# Patient Record
Sex: MALE | Race: OTHER | Employment: FULL TIME | URBAN - METROPOLITAN AREA
[De-identification: names, ages, dates, MRNs, and addresses within clinical notes are randomized per-mention and may not be internally consistent; named-entity substitution may affect disease eponyms.]

---

## 2018-02-04 ENCOUNTER — HOSPITAL ENCOUNTER (OUTPATIENT)
Age: 43
Discharge: HOME | End: 2018-02-04
Attending: EMERGENCY MEDICINE
Payer: COMMERCIAL

## 2018-02-04 VITALS
HEIGHT: 70 IN | TEMPERATURE: 97 F | SYSTOLIC BLOOD PRESSURE: 130 MMHG | OXYGEN SATURATION: 98 % | HEART RATE: 72 BPM | WEIGHT: 180 LBS | BODY MASS INDEX: 25.77 KG/M2 | RESPIRATION RATE: 20 BRPM | DIASTOLIC BLOOD PRESSURE: 92 MMHG

## 2018-02-04 DIAGNOSIS — S01.21XA LACERATION OF NOSE, INITIAL ENCOUNTER: Primary | ICD-10-CM

## 2018-02-04 PROCEDURE — 12011 RPR F/E/E/N/L/M 2.5 CM/<: CPT

## 2018-02-04 PROCEDURE — 90471 IMMUNIZATION ADMIN: CPT

## 2018-02-04 PROCEDURE — 99203 OFFICE O/P NEW LOW 30 MIN: CPT

## 2018-02-04 NOTE — ED INITIAL ASSESSMENT (HPI)
Patient was working last night and about 6-7 hours ago was hit in the nose with a large cable.  He has a laceration with skin flap on the bridge of his nose and superficial laceration near his inner right eye brow, and swelling and bruising to the right tanja

## 2018-02-04 NOTE — ED PROVIDER NOTES
Patient presents with:  Laceration Abrasion (integumentary)  Trauma (cardiovascular, musculoskeletal)    HPI:     Miguelangel Wilkins is a 38year old male who presents with chief complaint of nasal laceration.   About 6 hours ago pt was at work and a cable Anesthesia provided, as stated above. Laceration repaired as stated above. Patient tolerated the procedure well. MDM:     Nasal laceration - cleaned and closed with dermabond. Tdap updated. F/u with PCP in 2 days for wound re-check.      Assessment

## 2018-02-08 ENCOUNTER — HOSPITAL ENCOUNTER (OUTPATIENT)
Age: 43
Discharge: HOME | End: 2018-02-08
Attending: FAMILY MEDICINE
Payer: COMMERCIAL

## 2018-02-08 VITALS
BODY MASS INDEX: 25.77 KG/M2 | TEMPERATURE: 97 F | SYSTOLIC BLOOD PRESSURE: 121 MMHG | HEIGHT: 70 IN | HEART RATE: 63 BPM | RESPIRATION RATE: 16 BRPM | OXYGEN SATURATION: 99 % | WEIGHT: 180 LBS | DIASTOLIC BLOOD PRESSURE: 89 MMHG

## 2018-02-08 DIAGNOSIS — S01.21XD LACERATION OF NOSE, SUBSEQUENT ENCOUNTER: Primary | ICD-10-CM

## 2018-02-08 PROCEDURE — 99211 OFF/OP EST MAY X REQ PHY/QHP: CPT

## 2018-02-08 NOTE — ED PROVIDER NOTES
Patient Seen in: [id number] [facility] [place]    History   No chief complaint on file. Stated Complaint: follow up on nose injury    HPI    57-year-old male who returns to the clinic today as part of recheck for evaluation of nose laceration.    Amairani Jones S1, S2 normal, no murmur, click, rub or gallop, regular rate and rhythm  Abdomen: soft, non-tender; bowel sounds normal; no masses,  no organomegaly  Extremities: extremities normal, atraumatic, no cyanosis or edema  Pulses: 2+ and symmetric  Skin: Skin co

## 2018-02-08 NOTE — ED INITIAL ASSESSMENT (HPI)
Patient was treated here on 2/4/18 by Dr Mike Barnhart for a nasal laceration. Dermabond was applied. Patient here for follow up.

## 2019-03-02 ENCOUNTER — HOSPITAL ENCOUNTER (EMERGENCY)
Facility: HOSPITAL | Age: 44
Discharge: HOME | End: 2019-03-02
Attending: EMERGENCY MEDICINE
Payer: COMMERCIAL

## 2019-03-02 VITALS
HEART RATE: 65 BPM | WEIGHT: 167 LBS | SYSTOLIC BLOOD PRESSURE: 104 MMHG | DIASTOLIC BLOOD PRESSURE: 69 MMHG | HEIGHT: 69 IN | OXYGEN SATURATION: 97 % | BODY MASS INDEX: 24.73 KG/M2 | RESPIRATION RATE: 20 BRPM | TEMPERATURE: 97 F

## 2019-03-02 DIAGNOSIS — R55 SYNCOPE, VASOVAGAL: Primary | ICD-10-CM

## 2019-03-02 LAB
ANION GAP SERPL CALC-SCNC: 8 MMOL/L (ref 0–18)
BASOPHILS # BLD AUTO: 0.03 X10(3) UL (ref 0–0.2)
BASOPHILS NFR BLD AUTO: 0.6 %
BUN BLD-MCNC: 13 MG/DL (ref 7–18)
BUN/CREAT SERPL: 12.3 (ref 10–20)
CALCIUM BLD-MCNC: 8.4 MG/DL (ref 8.5–10.1)
CHLORIDE SERPL-SCNC: 109 MMOL/L (ref 98–107)
CO2 SERPL-SCNC: 24 MMOL/L (ref 21–32)
CREAT BLD-MCNC: 1.06 MG/DL (ref 0.7–1.3)
DEPRECATED RDW RBC AUTO: 42.3 FL (ref 35.1–46.3)
EOSINOPHIL # BLD AUTO: 0.08 X10(3) UL (ref 0–0.7)
EOSINOPHIL NFR BLD AUTO: 1.6 %
ERYTHROCYTE [DISTWIDTH] IN BLOOD BY AUTOMATED COUNT: 13 % (ref 11–15)
GLUCOSE BLD-MCNC: 89 MG/DL (ref 70–99)
HAV IGM SER QL: 2.2 MG/DL (ref 1.6–2.6)
HCT VFR BLD AUTO: 42 % (ref 39–53)
HGB BLD-MCNC: 14 G/DL (ref 13–17.5)
IMM GRANULOCYTES # BLD AUTO: 0.01 X10(3) UL (ref 0–1)
IMM GRANULOCYTES NFR BLD: 0.2 %
LYMPHOCYTES # BLD AUTO: 2.03 X10(3) UL (ref 1–4)
LYMPHOCYTES NFR BLD AUTO: 39.7 %
MCH RBC QN AUTO: 29.5 PG (ref 26–34)
MCHC RBC AUTO-ENTMCNC: 33.3 G/DL (ref 31–37)
MCV RBC AUTO: 88.4 FL (ref 80–100)
MONOCYTES # BLD AUTO: 0.56 X10(3) UL (ref 0.1–1)
MONOCYTES NFR BLD AUTO: 11 %
NEUTROPHILS # BLD AUTO: 2.4 X10 (3) UL (ref 1.5–7.7)
NEUTROPHILS # BLD AUTO: 2.4 X10(3) UL (ref 1.5–7.7)
NEUTROPHILS NFR BLD AUTO: 46.9 %
OSMOLALITY SERPL CALC.SUM OF ELEC: 292 MOSM/KG (ref 275–295)
PLATELET # BLD AUTO: 239 10(3)UL (ref 150–450)
POTASSIUM SERPL-SCNC: 3.2 MMOL/L (ref 3.5–5.1)
RBC # BLD AUTO: 4.75 X10(6)UL (ref 4.3–5.7)
SODIUM SERPL-SCNC: 141 MMOL/L (ref 136–145)
WBC # BLD AUTO: 5.1 X10(3) UL (ref 4–11)

## 2019-03-02 PROCEDURE — 99284 EMERGENCY DEPT VISIT MOD MDM: CPT

## 2019-03-02 PROCEDURE — 93010 ELECTROCARDIOGRAM REPORT: CPT | Performed by: EMERGENCY MEDICINE

## 2019-03-02 PROCEDURE — 36415 COLL VENOUS BLD VENIPUNCTURE: CPT

## 2019-03-02 PROCEDURE — 85025 COMPLETE CBC W/AUTO DIFF WBC: CPT | Performed by: EMERGENCY MEDICINE

## 2019-03-02 PROCEDURE — 80048 BASIC METABOLIC PNL TOTAL CA: CPT | Performed by: EMERGENCY MEDICINE

## 2019-03-02 PROCEDURE — 93005 ELECTROCARDIOGRAM TRACING: CPT

## 2019-03-02 PROCEDURE — 83735 ASSAY OF MAGNESIUM: CPT | Performed by: EMERGENCY MEDICINE

## 2019-03-02 RX ORDER — POTASSIUM CHLORIDE 20 MEQ/1
40 TABLET, EXTENDED RELEASE ORAL ONCE
Status: COMPLETED | OUTPATIENT
Start: 2019-03-02 | End: 2019-03-02

## 2019-03-03 NOTE — ED INITIAL ASSESSMENT (HPI)
BIBA for syncope at [address] [facility]. Pt thalia to stand, collapsed with LOC. EMS noted bradycardia down to 25 bpm en route.

## 2019-03-03 NOTE — ED PROVIDER NOTES
Patient Seen in: [facility] AND [facility] Emergency Department    History   Patient presents with:  Syncope (cardiovascular, neurologic)    Stated Complaint: quintin, syncope    HPI    53-year-old male with no significant past medical history presents to the [facility] Normocephalic and atraumatic. Ears: TM's clear b/l  Nose: Nose normal.   Mouth/Throat: MMM, post OP clear with no exudates  Eyes: Conjunctivae and EOM are normal. PERRLA  Neck: Normal range of motion. Neck supple. No tracheal deviation present.    CV: s1s episodes here in the emergency department. His potassium is low at 3.2 and has been replaced. Patient is comfortable discharge and follow-up with cardiology.   He has no further questions at this time            Disposition and Plan     Clinical Impressio

## (undated) NOTE — ED AVS SNAPSHOT
[facility]   [facility] [id number]    Department:  [facility] Emergency Department   Date of Visit:  3/2/2019           Disclosure     Insurance plans vary and the physician(s) referred by the ER may not be covered by your plan.  Please conta CARE PHYSICIAN AT ONCE OR RETURN IMMEDIATELY TO THE EMERGENCY DEPARTMENT. If you have been prescribed any medication(s), please fill your prescription right away and begin taking the medication(s) as directed.   If you believe that any of the medications